# Patient Record
Sex: FEMALE | Race: WHITE | Employment: FULL TIME | RURAL
[De-identification: names, ages, dates, MRNs, and addresses within clinical notes are randomized per-mention and may not be internally consistent; named-entity substitution may affect disease eponyms.]

---
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
## 2021-05-23 ENCOUNTER — HOSPITAL ENCOUNTER (EMERGENCY)
Age: 21
Discharge: HOME | End: 2021-05-23
Attending: PEDIATRICS
Payer: MEDICAID

## 2021-05-23 ENCOUNTER — APPOINTMENT (OUTPATIENT)
Dept: GENERAL RADIOLOGY | Age: 21
End: 2021-05-23
Attending: PEDIATRICS
Payer: MEDICAID

## 2021-05-23 VITALS
OXYGEN SATURATION: 97 % | RESPIRATION RATE: 20 BRPM | HEART RATE: 108 BPM | DIASTOLIC BLOOD PRESSURE: 54 MMHG | SYSTOLIC BLOOD PRESSURE: 105 MMHG | TEMPERATURE: 98.8 F | WEIGHT: 217.81 LBS

## 2021-05-23 DIAGNOSIS — U07.1 COVID-19 VIRUS INFECTION: Primary | ICD-10-CM

## 2021-05-23 DIAGNOSIS — R05.9 COUGH: ICD-10-CM

## 2021-05-23 LAB
COVID-19 RAPID TEST, COVR: DETECTED
HCG UR QL: NEGATIVE
SOURCE, COVRS: ABNORMAL

## 2021-05-23 PROCEDURE — 99284 EMERGENCY DEPT VISIT MOD MDM: CPT

## 2021-05-23 PROCEDURE — 87635 SARS-COV-2 COVID-19 AMP PRB: CPT

## 2021-05-23 PROCEDURE — 71045 X-RAY EXAM CHEST 1 VIEW: CPT

## 2021-05-23 PROCEDURE — 81025 URINE PREGNANCY TEST: CPT

## 2021-05-23 RX ORDER — AZITHROMYCIN 250 MG/1
TABLET, FILM COATED ORAL
Qty: 6 TABLET | Refills: 0 | Status: SHIPPED | OUTPATIENT
Start: 2021-05-23 | End: 2021-05-28

## 2021-05-23 NOTE — ED NOTES
REASSESSMENT: Pt is alert. No shortness of breath. Sats 100% on room air. Vital signs stable. Pt was given a pulse ox to take home with. EDUCATED to use the pulse ox and return for sats less than 93%. An appointment will be made for an infusion if pt chooses. Told pt that they will call her. Pt was also given a list of PCP's to make an appointment with for follow up. Discharge instructions and follow up information as well as pulse ox were given to patient with instructions to isolate. Pt states understanding.

## 2021-05-23 NOTE — ED PROVIDER NOTES
Please note that this dictation was completed with Dragon, computer voice recognition software. Quite often unanticipated grammatical, syntax, homophones, and other interpretive errors are inadvertently transcribed by the computer software. Please disregard these errors. Additionally, please excuse any errors that have escaped final proofreading. History of present illness:    Patient is a 60-year-old female previously well presents to the emergency department with concerns of possible Covid infection. She states that her symptoms have been present for 4 days. She denies any fever. She states she has no taste lack of smell. Positive complaints of cough is occasionally productive of yellowish sputum positive congestion states that she feels that she has periods of diaphoresis but no documented fever. She states she is not taking any medications at all for this. She states that her exposure was to her boyfriend's psychiatrist who turned out to be Covid positive and she was in the office with him. She states her boyfriend was symptomatic for 1 to 2 days no fevers just complained of myalgias. After 2 days he states he is fine. Occasional headache none at present. No changes in vision no sore throat no changes in appetite. Continues to eat and drink well with good urine and stool output. No abdominal pain no dysuria no hematuria. Last menstrual period was 1 month earlier and normal per patient. She denies history of asthma heart disease. Positive history of hypothyroidism. No other complaints no modifying factors no other concerns    Review of systems: A 10 point review was conducted. All pertinent positive and negatives are as stated in the HPI  Allergies: None  Medications: Synthroid  Immunizations: Up-to-date  Past medical history: Unremarkable except as described above  Family history: Noncontributory to this visit  Social history: Lives with family.   Denies smoking or drug use    Past Medical History:   Diagnosis Date    Depression     Thyroid activity decreased        Past Surgical History:   Procedure Laterality Date    HX ADENOIDECTOMY      HX TONSILLECTOMY      HX TYMPANOSTOMY           Family History:   Problem Relation Age of Onset    Psychiatric Disorder Mother     Psychiatric Disorder Father     Heart Disease Maternal Grandfather     Hypertension Maternal Grandfather        Social History     Socioeconomic History    Marital status: SINGLE     Spouse name: Not on file    Number of children: Not on file    Years of education: Not on file    Highest education level: Not on file   Occupational History    Not on file   Tobacco Use    Smoking status: Passive Smoke Exposure - Never Smoker    Smokeless tobacco: Never Used    Tobacco comment: several family members smoke in the home   Substance and Sexual Activity    Alcohol use: No    Drug use: No    Sexual activity: Yes     Partners: Male     Birth control/protection: Pill   Other Topics Concern    Not on file   Social History Narrative    Not on file     Social Determinants of Health     Financial Resource Strain:     Difficulty of Paying Living Expenses:    Food Insecurity:     Worried About Running Out of Food in the Last Year:     [address] in the Last Year:    Transportation Needs:     Lack of Transportation (Medical):      Lack of Transportation (Non-Medical):    Physical Activity:     Days of Exercise per Week:     Minutes of Exercise per Session:    Stress:     Feeling of Stress :    Social Connections:     Frequency of Communication with Friends and Family:     Frequency of Social Gatherings with Friends and Family:     Attends Islam Services:     Active Member of Clubs or Organizations:     Attends Club or Organization Meetings:     Marital Status:    Intimate Partner Violence:     Fear of Current or Ex-Partner:     Emotionally Abused:     Physically Abused:     Sexually Abused: ALLERGIES: Patient has no known allergies. Review of Systems   Constitutional: Negative for activity change and fever. HENT: Negative for congestion and sore throat. Eyes: Negative for photophobia, pain and visual disturbance. Respiratory: Positive for cough. Negative for chest tightness, shortness of breath and wheezing. Cardiovascular: Negative for chest pain and leg swelling. Gastrointestinal: Negative for abdominal pain, nausea and vomiting. Genitourinary: Negative for decreased urine volume and difficulty urinating. Musculoskeletal: Positive for myalgias. Neurological: Negative for weakness. All other systems reviewed and are negative. Vitals:    05/23/21 1540   BP: (!) 117/90   Pulse: (!) 108   Resp: 20   Temp: 98.8 °F (37.1 °C)   SpO2: 98%   Weight: 98.8 kg (217 lb 13 oz)            Physical Exam   PE:  GEN:  WDWN female alert non toxic in NAD talkative interactive well appearingspeaking easily, o2 sat 99% RA BMI 39  SK: CRT < 2 sec, good distal pulses. No lesions, no rashes, no petechiae, moist mm  HEENT: H: AT/NC. E: EOMI , PERRL, E: TM clear  N/T: Clear oropharynx  NECK: supple, no meningismus. No pain on palpation  Chest: Clear to auscultation, clear BS. NO rales, rhonchi, wheezes or distress. No Retraction. Chest Wall: no tenderness on palpation  CV: Regular rate and rhythm. Normal S1 S2 . No murmur, gallops or thrills  ABD: Soft non tender, no hepatomegaly, good bowel sound, no guarding, benign  MS: FROM all extremities, no long bone tenderness. No swelling, cyanosis, no edema. Good distal pulses. Gait normal  NEURO: Alert. No focality. Cranial nerves 2-12 grossly intact.  GCS 15  Behavior and mentation appropriate for age          MDM  Number of Diagnoses or Management Options  Cough  COVID-19 virus infection  Diagnosis management comments: Medical decision making:    Differential diagnosis includes: Viral syndrome, Covid, pneumonia    Physical exam is reassuring for nonserious illness at this time. Urine hCG: Negative  Chest x-ray: No acute cardiopulmonary disease  Covid: Positive    On repeat exam patient remains very well-appearing laughing talkative. Excellent perfusion    She has heart rate O2 sat were monitored by pulse oximetry. Per nursing they state heart rate decreased from 108  To 82. Discharge home on Zithromax secondary to complaints of cough productive of yellowish sputum    All precautions and Covid instructions reviewed with patient. She is understanding and agreeable to plan. Patient is at increased risk for complications of Covid secondary to her obesity. Her BMI is 39. Discussed infusion of monoclonal antibodies for her. All risks and benefits of infusion discussed. She is uncertain at this time. Information was provided to her and information faxed to the [facility] outpatient infusion center. Patient understands that she needs to call the center tomorrow they do not contact her by 10 AM to discuss the situation. Patient also discharged home with pulse oximeter and instructions should sats fall to 93% or less she is to notify her physician or return to the emergency department. All precautions reviewed.   She will return to the ER for any worsening symptoms including any trouble breathing fevers vomiting decrease in O2 saturations, lethargy irritability change in behavior or other new concerns    Clinical impression:  Productive  Cough  Covid infection         Amount and/or Complexity of Data Reviewed  Clinical lab tests: reviewed and ordered  Tests in the radiology section of CPT®: ordered and reviewed  Independent visualization of images, tracings, or specimens: yes           Procedures

## 2021-05-23 NOTE — ED TRIAGE NOTES
Pt states that for 4 days she has no taste, lack of smell, congestion and shortness of breath and has sweats. Has been exposed to covid.

## 2021-05-23 NOTE — DISCHARGE INSTRUCTIONS
Use the pulse oximeter which has been provided to you. Return to the emergency department immediately for any decreasing in oxygen saturation that is less than 93%. Your information has been faxed to the [facility] outpatient infusion center for an order for the monoclonal antibodies infusion for treatment of Covid. They should call you to arrange the appointment. Let them know your decision whether you would like to have this or not          Follow-up with your physician in 1 day      Return to the emergency department for any worsening symptoms including any trouble breathing, fevers, vomiting, decreasing oxygen saturation, decreased urine output or any other concerns    May take zinc, vitamin D, and vitamin C supplements over-the-counter if you desire.

## 2021-05-24 ENCOUNTER — PATIENT OUTREACH (OUTPATIENT)
Dept: CASE MANAGEMENT | Age: 21
End: 2021-05-24

## 2021-05-25 NOTE — PROGRESS NOTES
Outgoing calls placed to patient regarding recent visit to [facility]. no answer unable to leave message. Mailbox not set up, no release of PHI on file. Episode of care closed unable to contact.

## 2021-08-05 ENCOUNTER — HOSPITAL ENCOUNTER (EMERGENCY)
Age: 21
Discharge: HOME | End: 2021-08-05
Attending: PEDIATRICS
Payer: MEDICAID

## 2021-08-05 VITALS
SYSTOLIC BLOOD PRESSURE: 111 MMHG | WEIGHT: 217.81 LBS | OXYGEN SATURATION: 99 % | RESPIRATION RATE: 18 BRPM | TEMPERATURE: 99.2 F | DIASTOLIC BLOOD PRESSURE: 72 MMHG | HEART RATE: 70 BPM

## 2021-08-05 DIAGNOSIS — S09.22XA TRAUMATIC PERFORATION OF TYMPANIC MEMBRANE, LEFT, INITIAL ENCOUNTER: Primary | ICD-10-CM

## 2021-08-05 LAB
HCG UR QL: NEGATIVE
HCG UR QL: NEGATIVE

## 2021-08-05 PROCEDURE — 99284 EMERGENCY DEPT VISIT MOD MDM: CPT

## 2021-08-05 PROCEDURE — 74011250637 HC RX REV CODE- 250/637: Performed by: PEDIATRICS

## 2021-08-05 PROCEDURE — 81025 URINE PREGNANCY TEST: CPT

## 2021-08-05 RX ORDER — ACETAMINOPHEN 500 MG
500 TABLET ORAL
Status: COMPLETED | OUTPATIENT
Start: 2021-08-05 | End: 2021-08-05

## 2021-08-05 RX ORDER — OFLOXACIN 3 MG/ML
5 SOLUTION AURICULAR (OTIC) 2 TIMES DAILY
Qty: 5 ML | Refills: 0 | Status: SHIPPED | OUTPATIENT
Start: 2021-08-05 | End: 2021-08-10

## 2021-08-05 RX ADMIN — ACETAMINOPHEN 500 MG: 500 TABLET ORAL at 20:00

## 2021-08-05 NOTE — ED TRIAGE NOTES
TRIAGE: Per patient \"3days ago I fell, tripped over my dog and hit the left side of my face/head on the hardwood floor. I took a bunch of ibuprofen the next day because I had a lot of headache and my (left) ear was ringing. Now it's like something is in my ear, it's really muffled. \" Last night I had a sharp pain in my left ear and now it has been off and on. \"    Patient denies vomiting or LOC after fall.      No meds PTA

## 2021-08-06 NOTE — DISCHARGE INSTRUCTIONS
Call ENT listed above for follow up appointment  Motrin or tylenol as needed for pain  Antibiotics as prescribed

## 2021-08-06 NOTE — ED PROVIDER NOTES
23 y/o female with h/o depression, hypothyroid here with left ear muffled sound and intermittent pain after falling on the left side of her head a couple days ago. She tripped over her dog and fell landing on the left side of her face. She had no LOC. She had some bruising and pain to the left side of face and ringing in her ear when it happened. Since then she noticed last night it hurt when she laid down and today everything sounds muffled, like there is something in there. No fever; no v/d; She had covid about 1 month ago. She has had PE tubes in the past. No bleeding or drainage from ear. Pmh: pe tubes, depression, t and a  Social: vaccines utd; lives at home with family; The history is provided by the patient. Ear Pain   Pertinent negatives include no headaches, no sore throat, no diarrhea, no vomiting, no neck pain, no cough and no rash.         Past Medical History:   Diagnosis Date    Depression     Thyroid activity decreased        Past Surgical History:   Procedure Laterality Date    HX ADENOIDECTOMY      HX TONSILLECTOMY      HX TYMPANOSTOMY           Family History:   Problem Relation Age of Onset    Psychiatric Disorder Mother     Psychiatric Disorder Father     Heart Disease Maternal Grandfather     Hypertension Maternal Grandfather        Social History     Socioeconomic History    Marital status: SINGLE     Spouse name: Not on file    Number of children: Not on file    Years of education: Not on file    Highest education level: Not on file   Occupational History    Not on file   Tobacco Use    Smoking status: Passive Smoke Exposure - Never Smoker    Smokeless tobacco: Never Used    Tobacco comment: several family members smoke in the home   Substance and Sexual Activity    Alcohol use: No    Drug use: No    Sexual activity: Yes     Partners: Male     Birth control/protection: Pill   Other Topics Concern    Not on file   Social History Narrative    Not on file     Social Determinants of Health     Financial Resource Strain:     Difficulty of Paying Living Expenses:    Food Insecurity:     Worried About Running Out of Food in the Last Year:     [address] in the Last Year:    Transportation Needs:     Lack of Transportation (Medical):  Lack of Transportation (Non-Medical):    Physical Activity:     Days of Exercise per Week:     Minutes of Exercise per Session:    Stress:     Feeling of Stress :    Social Connections:     Frequency of Communication with Friends and Family:     Frequency of Social Gatherings with Friends and Family:     Attends [employer] Services:     Active Member of Clubs or Organizations:     Attends Club or Organization Meetings:     Marital Status:    Intimate Partner Violence:     Fear of Current or Ex-Partner:     Emotionally Abused:     Physically Abused:     Sexually Abused: ALLERGIES: Patient has no known allergies. Review of Systems   Constitutional: Negative. Negative for activity change, appetite change and fever. HENT: Positive for ear pain. Negative for sore throat. Respiratory: Negative. Negative for cough and wheezing. Cardiovascular: Negative. Negative for chest pain. Gastrointestinal: Negative. Negative for diarrhea and vomiting. Genitourinary: Negative. Musculoskeletal: Negative. Negative for back pain and neck pain. Skin: Negative. Negative for rash. Neurological: Negative. Negative for headaches. All other systems reviewed and are negative. Vitals:    08/05/21 1950   BP: 112/80   Pulse: 74   Resp: 18   Temp: 98.5 °F (36.9 °C)   SpO2: 99%   Weight: 98.8 kg (217 lb 13 oz)            Physical Exam  Vitals and nursing note reviewed. Constitutional:       General: She is not in acute distress. Appearance: She is well-developed. HENT:      Right Ear: External ear normal. No hemotympanum. Left Ear: External ear normal. There is hemotympanum.  Tympanic membrane is perforated. Ears:      Comments: Right tm with clear effusion; Left tm perforated with some dried blood in hole and a little behind tm. No active bleeding or drainage. Mouth/Throat:      Pharynx: No oropharyngeal exudate. Eyes:      Pupils: Pupils are equal, round, and reactive to light. Cardiovascular:      Rate and Rhythm: Normal rate and regular rhythm. Heart sounds: Normal heart sounds. Pulmonary:      Effort: Pulmonary effort is normal. No respiratory distress. Breath sounds: Normal breath sounds. No wheezing. Abdominal:      General: Bowel sounds are normal. There is no distension. Palpations: Abdomen is soft. Tenderness: There is no abdominal tenderness. There is no guarding or rebound. Musculoskeletal:         General: No tenderness. Normal range of motion. Cervical back: Normal range of motion and neck supple. Lymphadenopathy:      Cervical: No cervical adenopathy. Skin:     General: Skin is warm and dry. Capillary Refill: Capillary refill takes less than 2 seconds. Neurological:      General: No focal deficit present. Mental Status: She is alert and oriented to person, place, and time. Psychiatric:         Mood and Affect: Mood normal.          MDM  Number of Diagnoses or Management Options  Traumatic perforation of tympanic membrane, left, initial encounter  Diagnosis management comments: 23 y/o female with left facial trauma 2-3 days ago, hitting her left ear on the floor; now with perforated tm with dried blood. No headache, neck pain here;     I discussed h and p with Dr. Klarissa Hilton; He would not recommend obtaining ct scan at this time since she is 48-72 hours out. She has no pain or headache at this time and no acute neurological symptoms at this time. Will dc home on oflox and f/u with ENT. Return precautions discussed. Child has been re-examined and appears well. Child is active, interactive and appears well hydrated.  Laboratory tests, medications, x-rays, diagnosis, follow up plan and return instructions have been reviewed and discussed with the family. Family has had the opportunity to ask questions about their child's care. Family expresses understanding and agreement with care plan, follow up and return instructions. Family agrees to return the child to the ER in 48 hours if their symptoms are not improving or immediately if they have any change in their condition. Family understands to follow up with their pediatrician as instructed to ensure resolution of the issue seen for today.          Amount and/or Complexity of Data Reviewed  Obtain history from someone other than the patient: yes    Risk of Complications, Morbidity, and/or Mortality  Presenting problems: moderate  Diagnostic procedures: moderate  Management options: moderate    Patient Progress  Patient progress: stable         Procedures

## 2021-08-06 NOTE — ED NOTES
DISCHARGE: Patient given discharge instructions including prescriptions, suggested FU, accessing My Chart, returning for s/s of worsening, voiced understanding. EDUCATION: Patient educated on prescription, alternating tylenol/motrin for pain, using warm compress below affect ear for pain relief, not placing objects in the ear, keeping ears dry, avoiding swimming or showering, importance of FU, good hand/cough hygiene, voiced understanding.

## 2022-07-27 ENCOUNTER — HOSPITAL ENCOUNTER (EMERGENCY)
Age: 22
Discharge: HOME | End: 2022-07-27
Attending: PHYSICIAN ASSISTANT
Payer: MEDICAID

## 2022-07-27 VITALS
RESPIRATION RATE: 15 BRPM | OXYGEN SATURATION: 99 % | TEMPERATURE: 98.7 F | HEART RATE: 88 BPM | WEIGHT: 220 LBS | HEIGHT: 63 IN | DIASTOLIC BLOOD PRESSURE: 86 MMHG | SYSTOLIC BLOOD PRESSURE: 129 MMHG | BODY MASS INDEX: 38.98 KG/M2

## 2022-07-27 DIAGNOSIS — H66.90 ACUTE OTITIS MEDIA, UNSPECIFIED OTITIS MEDIA TYPE: Primary | ICD-10-CM

## 2022-07-27 PROCEDURE — 99283 EMERGENCY DEPT VISIT LOW MDM: CPT

## 2022-07-27 RX ORDER — AMOXICILLIN AND CLAVULANATE POTASSIUM 875; 125 MG/1; MG/1
1 TABLET, FILM COATED ORAL 2 TIMES DAILY
Qty: 14 TABLET | Refills: 0 | Status: SHIPPED | OUTPATIENT
Start: 2022-07-27 | End: 2022-08-03

## 2022-07-27 NOTE — DISCHARGE INSTRUCTIONS
Thank you for allowing us to provide you with medical care today. We realize that you have many choices for your emergency care needs. We thank you for choosing [facility]. Please choose us in the future for any continued health care needs. The exam and treatment you received in the emergency department were for an emergent problem and are not intended as complete care. It is important that you follow-up with a doctor. If your symptoms worsen or you do not improve should return to the emergency department. We are available 24 hours a day. Please make an appointment with your health care provider for follow-up of your emergency department visit. Take this sheet with you when you go to your follow-up visit.

## 2022-07-27 NOTE — ED PROVIDER NOTES
Patient is a 23-year-old female with past medical history of depression, hypothyroidism presenting for evaluation of right ear pain. Reports that it began today. Did not notice any drainage. No fevers at home. No mastoid tenderness. Noted that she has had a sore throat for a couple of days as well. No other complaints. Past Medical History:   Diagnosis Date    Depression     Thyroid activity decreased        Past Surgical History:   Procedure Laterality Date    HX ADENOIDECTOMY      HX TONSILLECTOMY      HX TYMPANOSTOMY           Family History:   Problem Relation Age of Onset    Psychiatric Disorder Mother     Psychiatric Disorder Father     Heart Disease Maternal Grandfather     Hypertension Maternal Grandfather        Social History     Socioeconomic History    Marital status: SINGLE     Spouse name: Not on file    Number of children: Not on file    Years of education: Not on file    Highest education level: Not on file   Occupational History    Not on file   Tobacco Use    Smoking status: Passive Smoke Exposure - Never Smoker    Smokeless tobacco: Never    Tobacco comments:     several family members smoke in the home   Substance and Sexual Activity    Alcohol use: No    Drug use: No    Sexual activity: Yes     Partners: Male     Birth control/protection: Pill   Other Topics Concern    Not on file   Social History Narrative    Not on file     Social Determinants of Health     Financial Resource Strain: Not on file   Food Insecurity: Not on file   Transportation Needs: Not on file   Physical Activity: Not on file   Stress: Not on file   Social Connections: Not on file   Intimate Partner Violence: Not on file   Housing Stability: Not on file         ALLERGIES: Patient has no known allergies. Review of Systems   Constitutional:  Negative for fever and unexpected weight change. HENT:  Positive for ear pain and sore throat. Negative for congestion and ear discharge.     Eyes:  Negative for visual disturbance. Respiratory:  Negative for cough, chest tightness and shortness of breath. Cardiovascular:  Negative for chest pain. Gastrointestinal:  Negative for abdominal pain, nausea and vomiting. Endocrine: Negative for polyuria. Genitourinary:  Negative for dysuria and flank pain. Musculoskeletal:  Negative for back pain. Skin:  Negative for color change. Allergic/Immunologic: Negative for immunocompromised state. Neurological:  Negative for dizziness and headaches. Hematological:  Negative for adenopathy. Psychiatric/Behavioral:  Negative for agitation. Vitals:    07/27/22 1812   BP: 129/86   Pulse: 88   Resp: 15   Temp: 98.7 °F (37.1 °C)   SpO2: 99%   Weight: 99.8 kg (220 lb)   Height: 5' 3\" (1.6 m)            Physical Exam  Vitals and nursing note reviewed. Constitutional:       Appearance: Normal appearance. She is normal weight. HENT:      Head: Atraumatic. Right Ear: Ear canal and external ear normal. No mastoid tenderness. Tympanic membrane is erythematous. Left Ear: Tympanic membrane, ear canal and external ear normal. No mastoid tenderness. Tympanic membrane is not erythematous. Mouth/Throat: Tonsils: No tonsillar exudate or tonsillar abscesses. 0 on the right. 0 on the left. Eyes:      Conjunctiva/sclera: Conjunctivae normal.      Pupils: Pupils are equal, round, and reactive to light. Cardiovascular:      Rate and Rhythm: Normal rate. Pulmonary:      Effort: Pulmonary effort is normal.   Abdominal:      General: Abdomen is flat. Bowel sounds are normal.   Musculoskeletal:         General: Normal range of motion. Cervical back: Neck supple. Skin:     General: Skin is warm and dry. Capillary Refill: Capillary refill takes less than 2 seconds. Neurological:      General: No focal deficit present. Mental Status: She is alert and oriented to person, place, and time. Mental status is at baseline.    Psychiatric:         Mood and Affect: Mood normal.         Behavior: Behavior normal.        MDM  Number of Diagnoses or Management Options  Acute otitis media, unspecified otitis media type  Diagnosis management comments: Patient presenting with right ear pain. Physical exam consistent with acute otitis media. Patient overall nontoxic-appearing with stable vital signs. No mastoid tenderness. We will treat patient with oral Augmentin. Advised to follow-up closely with primary care. Discussed my clinical impression(s), any labs and/or radiology results with the patient. I answered any questions and addressed any concerns. Discussed the importance of following up with their primary care physician and/or specialist(s). Discussed signs or symptoms that would warrant return back to the ER for further evaluation. The patient is agreeable with discharge.     Patient Progress  Patient progress: stable         Procedures

## 2024-12-15 ENCOUNTER — HOSPITAL ENCOUNTER (EMERGENCY)
Facility: HOSPITAL | Age: 24
Discharge: HOME | End: 2024-12-15
Attending: EMERGENCY MEDICINE

## 2024-12-15 VITALS
HEIGHT: 63 IN | WEIGHT: 221.12 LBS | TEMPERATURE: 98.1 F | OXYGEN SATURATION: 97 % | BODY MASS INDEX: 39.18 KG/M2 | DIASTOLIC BLOOD PRESSURE: 96 MMHG | HEART RATE: 95 BPM | SYSTOLIC BLOOD PRESSURE: 155 MMHG | RESPIRATION RATE: 15 BRPM

## 2024-12-15 DIAGNOSIS — K08.89 PAIN, DENTAL: Primary | ICD-10-CM

## 2024-12-15 PROCEDURE — 99283 EMERGENCY DEPT VISIT LOW MDM: CPT

## 2024-12-15 ASSESSMENT — PAIN - FUNCTIONAL ASSESSMENT: PAIN_FUNCTIONAL_ASSESSMENT: NONE - DENIES PAIN

## 2024-12-15 NOTE — ED TRIAGE NOTES
Had tooth pulled two days ago. Is now having chest tightness, fatigue, odd smell and taste from site. Is concerned that they didn't put her on antibiotics.

## 2024-12-16 NOTE — ED PROVIDER NOTES
[facility] EMERGENCY DEP  EMERGENCY DEPARTMENT ENCOUNTER      Pt Name: Kimi Buckner  MRN: [id number]  Birthdate 2000  Date of evaluation: 12/15/2024  Provider: TINA Zaidi    CHIEF COMPLAINT       Chief Complaint   Patient presents with    Dental Pain         HISTORY OF PRESENT ILLNESS   (Location/Symptom, Timing/Onset, Context/Setting, Quality, Duration, Modifying Factors, Severity)  Note limiting factors.   Kimi Buckner is a 24 y.o. female with past medical history as listed below who presents to the emergency department for pain to her left lower gums.  Patient had an infected tooth pulled 2 days ago at [facility] and is concerned because she was not placed on antibiotics, she was not put on antibiotics prior to the extraction either.  She states she has had worsening pain to the area and feels a foul taste in her mouth concerning for pus.  No fevers.  No facial swelling.  No trouble swallowing.      Nursing Notes were reviewed.    REVIEW OF SYSTEMS    (2-9 systems for level 4, 10 or more for level 5)     Review of Systems   Constitutional:  Negative for fever.   HENT:  Positive for dental problem. Negative for congestion and sore throat.    Eyes:  Negative for visual disturbance.   Respiratory:  Negative for cough and shortness of breath.    Cardiovascular:  Negative for chest pain.   Gastrointestinal:  Negative for abdominal pain, diarrhea and vomiting.   Genitourinary:  Negative for dysuria.   Musculoskeletal:  Negative for back pain and neck pain.   Skin:  Negative for color change.   Neurological:  Negative for dizziness and headaches.   Psychiatric/Behavioral:  Negative for confusion.        Except as noted above the remainder of the review of systems was reviewed and negative.       PAST MEDICAL HISTORY     Past Medical History:   Diagnosis Date    Depression     Thyroid activity decreased        SURGICAL HISTORY       Past Surgical History:   Procedure Laterality Date    ADENOIDECTOMY